# Patient Record
Sex: MALE | Race: WHITE
[De-identification: names, ages, dates, MRNs, and addresses within clinical notes are randomized per-mention and may not be internally consistent; named-entity substitution may affect disease eponyms.]

---

## 2018-12-17 ENCOUNTER — HOSPITAL ENCOUNTER (EMERGENCY)
Dept: HOSPITAL 74 - JER | Age: 78
Discharge: HOME | End: 2018-12-17
Payer: COMMERCIAL

## 2018-12-17 VITALS — HEART RATE: 73 BPM | DIASTOLIC BLOOD PRESSURE: 74 MMHG | SYSTOLIC BLOOD PRESSURE: 122 MMHG | TEMPERATURE: 98.2 F

## 2018-12-17 VITALS — BODY MASS INDEX: 38.7 KG/M2

## 2018-12-17 DIAGNOSIS — M79.605: Primary | ICD-10-CM
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
## 2019-01-12 ENCOUNTER — HOSPITAL ENCOUNTER (INPATIENT)
Dept: HOSPITAL 74 - JER | Age: 79
LOS: 6 days | Discharge: NURSING, REHAB OR LONG-TERM CARE | DRG: 481 | End: 2019-01-18
Attending: SPECIALIST | Admitting: SPECIALIST
Payer: COMMERCIAL

## 2019-01-12 VITALS — BODY MASS INDEX: 38.2 KG/M2

## 2019-01-12 DIAGNOSIS — E22.2: ICD-10-CM

## 2019-01-12 DIAGNOSIS — J98.11: ICD-10-CM
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
## 2019-01-12 DIAGNOSIS — E66.9: ICD-10-CM
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
## 2019-01-12 DIAGNOSIS — I10: ICD-10-CM

## 2019-01-12 DIAGNOSIS — S72.302A: Primary | ICD-10-CM

## 2019-01-12 DIAGNOSIS — E78.5: ICD-10-CM

## 2019-01-12 LAB
ALBUMIN SERPL-MCNC: 3.8 G/DL (ref 3.4–5)
ALP SERPL-CCNC: 121 U/L (ref 45–117)
ALT SERPL-CCNC: 18 U/L (ref 13–61)
ANION GAP SERPL CALC-SCNC: 6 MMOL/L (ref 8–16)
APTT BLD: 33 SECONDS (ref 25.2–36.5)
AST SERPL-CCNC: 18 U/L (ref 15–37)
BASOPHILS # BLD: 0.7 % (ref 0–2)
BILIRUB SERPL-MCNC: 0.5 MG/DL (ref 0.2–1)
BUN SERPL-MCNC: 24 MG/DL (ref 7–18)
CALCIUM SERPL-MCNC: 8.1 MG/DL (ref 8.5–10.1)
CHLORIDE SERPL-SCNC: 103 MMOL/L (ref 98–107)
CO2 SERPL-SCNC: 28 MMOL/L (ref 21–32)
CREAT SERPL-MCNC: 1.2 MG/DL (ref 0.55–1.3)
DEPRECATED RDW RBC AUTO: 12.7 % (ref 11.9–15.9)
EOSINOPHIL # BLD: 6.5 % (ref 0–4.5)
GLUCOSE SERPL-MCNC: 99 MG/DL (ref 74–106)
HCT VFR BLD CALC: 42.4 % (ref 35.4–49)
HGB BLD-MCNC: 14.7 GM/DL (ref 11.7–16.9)
INR BLD: 1.08 (ref 0.83–1.09)
LYMPHOCYTES # BLD: 14.1 % (ref 8–40)
MCH RBC QN AUTO: 32.4 PG (ref 25.7–33.7)
MCHC RBC AUTO-ENTMCNC: 34.7 G/DL (ref 32–35.9)
MCV RBC: 93.3 FL (ref 80–96)
MONOCYTES # BLD AUTO: 5.7 % (ref 3.8–10.2)
NEUTROPHILS # BLD: 73 % (ref 42.8–82.8)
PLATELET # BLD AUTO: 142 K/MM3 (ref 134–434)
PMV BLD: 11 FL (ref 7.5–11.1)
POTASSIUM SERPLBLD-SCNC: 4.5 MMOL/L (ref 3.5–5.1)
PROT SERPL-MCNC: 6.6 G/DL (ref 6.4–8.2)
PT PNL PPP: 12.7 SEC (ref 9.7–13)
RBC # BLD AUTO: 4.54 M/MM3 (ref 4–5.6)
SODIUM SERPL-SCNC: 137 MMOL/L (ref 136–145)
WBC # BLD AUTO: 8.6 K/MM3 (ref 4–10)

## 2019-01-12 RX ADMIN — PANTOPRAZOLE SODIUM SCH MG: 40 TABLET, DELAYED RELEASE ORAL at 16:14

## 2019-01-12 RX ADMIN — ATORVASTATIN CALCIUM SCH MG: 40 TABLET, FILM COATED ORAL at 22:55

## 2019-01-12 NOTE — PDOC
History of Present Illness





- General


Stated Complaint: LEFT KNEE PAIN


Time Seen by Provider: 01/12/19 12:55





- History of Present Illness


Initial Comments: 





01/12/19 12:55


Mr. Rdz is a 79 yo male w/ pmh of HTN, HLD, left knee replacement (w/ 

chronic pain) who presents for evaluation of pain to left knee after falling in 

the bath tub today. Patient had tri phasic bone scan 1/8/18 for his chronic 

pain which revealed no acute findings. Currently scheduled to follow-up with 

orthopedics on Monday. Patient currently reporting exacerbation of his L knee 

pain. Took oxycodone at home already.





The patient denies chest pain, shortness of breath, headache and dizziness. 

Denies fever, chills, nausea, vomit, diarrhea and constipation. Denies dysuria, 

frequency, urgency and hematuria. 





Past History





- Past Medical History


Allergies/Adverse Reactions: 


 Allergies











Allergy/AdvReac Type Severity Reaction Status Date / Time


 


valdecoxib [From Bextra] Allergy   Verified 12/17/18 10:57











Home Medications: 


Ambulatory Orders





Atenolol [Tenormin] 50 mg PO DAILY 12/17/18 


Atorvastatin Ca [Lipitor] 40 mg PO HS 12/17/18 


Diazepam [Valium] 10 mg PO DAILY PRN 12/17/18 


Naproxen [Naprosyn -] 500 mg PO BID 01/12/19 


Omeprazole 20 mg PO DAILY 01/12/19 


Oxycodone HCl [Roxicodone] 5 mg PO PRN PRN 01/12/19 








COPD: No


HTN: Yes





- Immunization History


Immunization Up to Date: No





- Suicide/Smoking/Psychosocial Hx


Smoking History: Never smoked


Have you smoked in the past 12 months: No


Hx Alcohol Use: No


Drug/Substance Use Hx: No





**Review of Systems





- Review of Systems


Comments:: 





01/12/19 12:55


GENERAL/CONSTITUTIONAL: No fever or chills. No weakness.


HEAD, EYES, EARS, NOSE AND THROAT: No change in vision. No ear pain or 

discharge. No sore throat.


CARDIOVASCULAR: No chest pain or shortness of breath


RESPIRATORY: No cough, wheezing, or hemoptysis.


GASTROINTESTINAL: No nausea, vomiting, diarrhea or constipation.


GENITOURINARY: No dysuria, frequency, or change in urination.


MUSCULOSKELETAL: +L knee pain as described. 


SKIN: No rash


NEUROLOGIC: No headache, vertigo, loss of consciousness, or change in strength/

sensation.


ENDOCRINE: No increased thirst. No abnormal weight change


HEMATOLOGIC/LYMPHATIC: No anemia, easy bleeding, or history of blood clots.


ALLERGIC/IMMUNOLOGIC: No hives or skin allergy.


01/12/19 13:09








*Physical Exam





- Physical Exam


Comments: 





01/12/19 12:55


GENERAL: Awake, alert, and fully oriented, in no acute distress


HEAD: No signs of trauma, normocephalic, atraumatic 


EYES: PERRLA, EOMI, sclera anicteric, conjunctiva clear


ENT: Auricles normal inspection, hearing grossly normal, nares patent, 

oropharynx clear without


exudates. Moist mucosa


NECK: Normal ROM, supple, no lymphadenopathy, JVD, or masses


LUNGS: No distress, speaks full sentences, clear to auscultation bilaterally 


HEART: Regular rate and rhythm, normal S1 and S2, no murmurs, rubs or gallops, 

peripheral pulses normal and equal bilaterally. 


ABDOMEN: Soft, nontender, normoactive bowel sounds. No guarding, no rebound. No 

masses


EXTREMITIES: +Left knee TTP. Bruising noted to LLE, outside thigh. Pain w/ 

flexion; unable to perform ROM testing due to pain.


NEUROLOGICAL: Cranial nerves II through XII grossly intact. Normal speech, 

normal gait, no focal sensorimotor deficits 


SKIN: Warm, Dry, normal turgor, no rashes or lesions noted. 





Medical Decision Making





- Medical Decision Making





01/12/19 14:40


Mr. Rdz is a 79 yo male w/ pmh as described who presents for evaluation of 

knee pain s/p fall. Patient pain controlled with percocet. XR taken of knee, 

femur, pelvis revealed L midshaft femoral fracture. Orthopedist paged for 

evaluation. PCP paged for admission.





01/12/19 15:04


Patient admitted. Orthopedist consulted and will evaluate. Likely surgery 

Monday 1/14/2019.





*DC/Admit/Observation/Transfer


Diagnosis at time of Disposition: 


Femur fracture, left


Qualifiers:


 Encounter type: initial encounter Femur location: unspecified portion of femur 

Fracture type: closed Fracture morphology: unspecified fracture morphology 

Qualified Code(s): S72.92XA - Unspecified fracture of left femur, initial 

encounter for closed fracture








- Discharge Dispostion


Decision to Admit order: Yes





- Referrals


Referrals: 


Hong Mustafa MD [Primary Care Provider] - 





- Patient Instructions





- Post Discharge Activity

## 2019-01-12 NOTE — CON.CARD
Consult


Consult Specialty:: Cardiology


Reason for Consultation:: preop eval





- History of Present Illness


History of Present Illness: 





The patient is a 78 year old male with a significant PMH of HTN, HLD, left knee 

replacement who presents to the emergency department complaining of left knee 

pain s/p fall today morning. Patient took oxycodone earlier with no relief. 

Patient states he fell when he was in the shower today. Patient had a bone scan 

on 1/8/18 for chronic left knee pain. Patient took oxycodone earlier with no 

relief. 





The patient denies chest pain, shortness of breath, headache and dizziness.


Denies fever, chills, nausea, vomit, diarrhea and constipation.


Denies dysuria, frequency, urgency and hematuria.


 


Allergies: NKA


Past surgical history: left knee replacement


Social history: No reported alcohol, drug or cigarette use. 


PCP: Dr. Mustafa








- History Source


History Provided By: Patient, Medical Record





- Past Medical History


Cardio/Vascular: Yes: HTN, Hyperlipdemia





- Alcohol/Substance Use


Hx Alcohol Use: No





- Smoking History


Smoking history: Never smoked


Have you smoked in the past 12 months: No





Home Medications





- Allergies


Allergies/Adverse Reactions: 


 Allergies











Allergy/AdvReac Type Severity Reaction Status Date / Time


 


valdecoxib [From Bextra] Allergy   Verified 12/17/18 10:57














- Home Medications


Home Medications: 


Ambulatory Orders





Atenolol [Tenormin] 50 mg PO DAILY 12/17/18 


Atorvastatin Ca [Lipitor] 40 mg PO HS 12/17/18 


Diazepam [Valium] 10 mg PO DAILY PRN 12/17/18 


Naproxen [Naprosyn -] 500 mg PO BID 01/12/19 


Omeprazole 20 mg PO DAILY 01/12/19 


Oxycodone HCl [Roxicodone] 5 mg PO PRN PRN 01/12/19 











Review of Systems





- Review of Systems


Constitutional: reports: No Symptoms


Eyes: reports: No Symptoms


HENT: reports: No Symptoms


Neck: reports: No Symptoms


Cardiovascular: reports: No Symptoms


Gastrointestinal: reports: No Symptoms


Genitourinary: reports: No Symptoms


Breasts: reports: No Symptoms Reported


Musculoskeletal: reports: No Symptoms


Integumentary: reports: No Symptoms


Neurological: reports: No Symptoms


Endocrine: reports: No Symptoms


Hematology/Lymphatic: reports: No Symptoms


Psychiatric: reports: No Symptoms


Vital Signs: 


 Vital Signs











Temperature  98.3 F   01/12/19 16:20


 


Pulse Rate  57 L  01/12/19 16:20


 


Respiratory Rate  16   01/12/19 14:57


 


Blood Pressure  134/71   01/12/19 16:20


 


O2 Sat by Pulse Oximetry (%)  93 L  01/12/19 16:20











Constitutional: Yes: Well Nourished, No Distress, Calm


Eyes: Yes: WNL, Conjunctiva Clear, EOM Intact


HENT: Yes: WNL, Atraumatic, Normocephalic


Neck: Yes: WNL, Supple, Trachea Midline


Respiratory: Yes: WNL, Regular, CTA Bilaterally


Gastrointestinal: Yes: WNL, Normal Bowel Sounds


Renal/: Yes: WNL


Cardiovascular: Yes: WNL, Regular Rate and Rhythm


Musculoskeletal: Yes: WNL


Extremities: Yes: WNL


Integumentary: Yes: WNL


Neurological: Yes: WNL, Alert, Oriented


...Motor Strength: WNL


Psychiatric: Yes: WNL, Alert, Oriented





- Other Data


Labs, Other Data: 


 CBC, BMP





 01/12/19 15:15 





 01/12/19 15:15 





 INR, PTT











INR  1.08  (0.83-1.09)   01/12/19  15:15    








 Laboratory Tests











  01/12/19 01/12/19 01/12/19





  15:15 15:15 15:15


 


WBC  8.6  


 


RBC  4.54  


 


Hgb  14.7  


 


Hct  42.4  


 


MCV  93.3  


 


MCH  32.4  


 


MCHC  34.7  


 


RDW  12.7  


 


Plt Count  142  


 


MPV  11.0  


 


Absolute Neuts (auto)  6.3  


 


Neutrophils %  73.0  


 


Lymphocytes %  14.1  


 


Monocytes %  5.7  


 


Eosinophils %  6.5 H  


 


Basophils %  0.7  


 


Nucleated RBC %  0  


 


PT with INR   12.70 


 


INR   1.08 


 


PTT (Actin FS)   33.0 


 


Sodium    137


 


Potassium    4.5


 


Chloride    103


 


Carbon Dioxide    28


 


Anion Gap    6 L


 


BUN    24 H


 


Creatinine    1.2


 


Creat Clearance w eGFR    58.56


 


Random Glucose    99


 


Calcium    8.1 L


 


Total Bilirubin    0.5


 


AST    18


 


ALT    18


 


Alkaline Phosphatase    121 H


 


Total Protein    6.6


 


Albumin    3.8


 


Anti-A Titer   


 


Blood Type   


 


Antibody Screen   














  01/12/19 01/12/19





  15:15 17:22


 


WBC  


 


RBC  


 


Hgb  


 


Hct  


 


MCV  


 


MCH  


 


MCHC  


 


RDW  


 


Plt Count  


 


MPV  


 


Absolute Neuts (auto)  


 


Neutrophils %  


 


Lymphocytes %  


 


Monocytes %  


 


Eosinophils %  


 


Basophils %  


 


Nucleated RBC %  


 


PT with INR  


 


INR  


 


PTT (Actin FS)  


 


Sodium  


 


Potassium  


 


Chloride  


 


Carbon Dioxide  


 


Anion Gap  


 


BUN  


 


Creatinine  


 


Creat Clearance w eGFR  


 


Random Glucose  


 


Calcium  


 


Total Bilirubin  


 


AST  


 


ALT  


 


Alkaline Phosphatase  


 


Total Protein  


 


Albumin  


 


Anti-A Titer  Cancelled 


 


Blood Type  Cancelled  O POSITIVE


 


Antibody Screen  Cancelled  Negative














Imaging





- Results


Chest X-ray: Image Reviewed (no i/e)


EKG: Image Reviewed (sr lvh rep abn no chanes)





Problem List





- Problems


(1) Femur fracture, left


Code(s): S72.92XA - UNSP FRACTURE OF LEFT FEMUR, INIT ENCNTR FOR CLOSED 

FRACTURE   


Qualifiers: 


   Encounter type: initial encounter   Femur location: unspecified portion of 

femur   Fracture type: closed   Fracture morphology: unspecified fracture 

morphology   Qualified Code(s): S72.92XA - Unspecified fracture of left femur, 

initial encounter for closed fracture   





(2) HLD (hyperlipidemia)


Code(s): E78.5 - HYPERLIPIDEMIA, UNSPECIFIED   





(3) HTN (hypertension)


Code(s): I10 - ESSENTIAL (PRIMARY) HYPERTENSION   





(4) Obesity


Code(s): E66.9 - OBESITY, UNSPECIFIED   





(5) Leg pain, left


Code(s): M79.605 - PAIN IN LEFT LEG   





Assessment/Plan





l femur fx


s/p l tkr


htn


hlp


reports negative cardiac w/u last year 


no evidence of angina or mi, reports good exercise tolerance prior to fall





Plan








patient is low risk for cardiovascular complications for ORIF.





cont DVT plx

## 2019-01-12 NOTE — PDOC
Attending Attestation





- HPI


HPI: 





01/12/19 13:47


 The patient is a 78 year old male with a significant PMH of HTN, HLD, left 

knee replacement who presents to the emergency department complaining of left 

knee pain s/p fall today morning. Patient took oxycodone earlier with no 

relief. Patient states he fell when he was in the shower today. Patient had a 

bone scan on 1/8/18 for chronic left knee pain. Patient took oxycodone earlier 

with no relief. 





The patient denies chest pain, shortness of breath, headache and dizziness.


Denies fever, chills, nausea, vomit, diarrhea and constipation.


Denies dysuria, frequency, urgency and hematuria.


 


Allergies: NKA


Past surgical history: left knee replacement


Social history: No reported alcohol, drug or cigarette use. 


PCP: Dr. Mustafa





 





<Marylou Vaz - Last Filed: 01/12/19 13:48>





- Resident


Resident Name: Shyam De Jesus





- ED Attending Attestation


I have performed the following: I have examined & evaluated the patient, The 

case was reviewed & discussed with the resident, I agree w/resident's findings 

& plan, Exceptions are as noted





- Physicial Exam


PE: 





GENERAL: Awake, alert, and fully oriented, in no acute distress


HEAD: No signs of trauma


EYES: PERRLA, EOMI, sclera anicteric, conjunctiva clear


ENT: Auricles normal inspection, hearing grossly normal, nares patent, 

oropharynx clear without exudates. Moist mucosa


NECK: Normal ROM, supple, no lymphadenopathy, JVD, or masses


LUNGS: Breath sounds equal, clear to auscultation bilaterally.  No wheezes, and 

no crackles


HEART: Regular rate and rhythm, normal S1 and S2, no murmurs, rubs or gallops


ABDOMEN: Soft, nontender, normoactive bowel sounds.  No guarding, no rebound.  

No masses


EXTREMITIES: L leg with ecchymosis to the lateral thigh. No bony tenderness to 

the thigh or knee, however, pain is elicited upon movement of the L leg. +

Swelling to the L knee. Remainder of extremities with normal range of motion, 

no edema.  No clubbing or cyanosis. No cords, erythema, or tenderness


NEUROLOGICAL: Cranial nerves II through XII grossly intact.  Normal speech. 

Motor and sensation intact


SKIN: Warm, Dry, normal turgor, no rashes or lesions noted. 








- Medical Decision Making





Pt with L knee pain, noted to have L thigh ecchymosis. Obtained XR hip, femur, 

and knee on the L. Pt found to have L mid-shaft femur fx. Contacted PMD and 

ortho. For admission.








<Nasreen White - Last Filed: 01/12/19 15:00>

## 2019-01-12 NOTE — HP
Admitting History and Physical





- Primary Care Physician


PCP: Hong Mustafa





- Admission


Chief Complaint: Left Knee Pain.  Fall


History of Present Illness: 





Pt lost his footing and fell in the bathroom today and developed left leg pain; 

pt w/o head trauma. He came to ER and it was noticed to have Left femoral Fx


Limitations to Obtaining History: No Limitations





- Past Medical History


Cardiovascular: Yes: HTN, Hyperlipdemia


Additional Past Medical History: 





Left elbow Fx -as a child


Left Knee replacement





- Smoking History


Smoking history: Never smoked


Have you smoked in the past 12 months: No





- Alcohol/Substance Use


Hx Alcohol Use: No





Home Medications





- Allergies


Allergies/Adverse Reactions: 


 Allergies











Allergy/AdvReac Type Severity Reaction Status Date / Time


 


valdecoxib [From Bextra] Allergy   Verified 12/17/18 10:57














- Home Medications


Home Medications: 


Ambulatory Orders





Atenolol [Tenormin] 50 mg PO DAILY 12/17/18 


Atorvastatin Ca [Lipitor] 40 mg PO HS 12/17/18 


Diazepam [Valium] 10 mg PO DAILY PRN 12/17/18 


Naproxen [Naprosyn -] 500 mg PO BID 01/12/19 


Omeprazole 20 mg PO DAILY 01/12/19 


Oxycodone HCl [Roxicodone] 5 mg PO PRN PRN 01/12/19 











Review of Systems





- Review of Systems


Constitutional: denies: Chills, Fever


Eyes: denies: Blurred Vision, Recent Change in Vision


HENT: denies: Difficult Swallowing, Ear Pain, Nasal Congestion, Throat Pain


Neck: denies: Pain on Movement, Stiffness


Cardiovascular: denies: Chest Pain, Edema, Palpitations


Respiratory: denies: Cough, SOB, SOB on Exertion, Wheezing


Gastrointestinal: denies: Abdominal Pain, Nausea, Vomiting


Genitourinary: denies: Burning, Discharge, Dysuria


Musculoskeletal: denies: Back Pain, Muscle Pain


Integumentary: denies: Bruising, Rash


Neurological: denies: Change in LOC, Numbness, Weakness


Endocrine: denies: Excessive Sweating, Intolerance to Cold


Psychiatric: denies: Altered Sleep Pattern, Anxiety, Depression





Physical Examination


Vital Signs: 


 Vital Signs











Temperature  98.5 F   01/12/19 14:57


 


Pulse Rate  67   01/12/19 14:57


 


Respiratory Rate  16   01/12/19 14:57


 


Blood Pressure  137/77   01/12/19 14:57


 


O2 Sat by Pulse Oximetry (%)  95   01/12/19 14:57











Constitutional: Yes: No Distress, Calm


Eyes: Yes: EOM Intact, PERRL


HENT: Yes: Normocephalic.  No: Pharyngeal Erythema, Rhinnorhea, Thrush


Neck: Yes: Trachea Midline.  No: Lymphadenopathy


Cardiovascular: Yes: Regular Rate and Rhythm, S1, S2


Respiratory: Yes: Regular, CTA Bilaterally.  No: Rales


Gastrointestinal: Yes: Normal Bowel Sounds, Soft, Abdomen, Obese.  No: 

Tenderness


...Rectal Exam: Yes: Deferred


Renal/: No: CVA Tenderness - Left, CVA Tenderness - Right


Musculoskeletal: No: Back Pain, Joint Swelling


Extremities: Yes: Cool, Other (lwft leg is externally rotated).  No: Cold


Edema: No


Neurological: Yes: Alert, Oriented, Other (motor and sensory examinatin is 

symmetric in UE/LE/ face)


Psychiatric: Yes: Alert, Oriented


Labs: 


 CBC, BMP





 01/12/19 15:15 











Imaging





- Results


X-ray: Report Reviewed





Problem List





- Problems


(1) Femur fracture, left


Code(s): S72.92XA - UNSP FRACTURE OF LEFT FEMUR, INIT ENCNTR FOR CLOSED 

FRACTURE   


Qualifiers: 


   Encounter type: initial encounter   Femur location: unspecified portion of 

femur   Fracture type: closed   Fracture morphology: unspecified fracture 

morphology   Qualified Code(s): S72.92XA - Unspecified fracture of left femur, 

initial encounter for closed fracture   





(2) Leg pain, left


Code(s): M79.605 - PAIN IN LEFT LEG   





(3) HTN (hypertension)


Code(s): I10 - ESSENTIAL (PRIMARY) HYPERTENSION   





(4) HLD (hyperlipidemia)


Code(s): E78.5 - HYPERLIPIDEMIA, UNSPECIFIED   





(5) Obesity


Code(s): E66.9 - OBESITY, UNSPECIFIED   





Assessment/Plan





Ortho consult


Cardio consult for clearance


Pain ncontrol


DVT prophylaxis


AM labs

## 2019-01-13 LAB
ANION GAP SERPL CALC-SCNC: 5 MMOL/L (ref 8–16)
BUN SERPL-MCNC: 23 MG/DL (ref 7–18)
CALCIUM SERPL-MCNC: 8.8 MG/DL (ref 8.5–10.1)
CHLORIDE SERPL-SCNC: 102 MMOL/L (ref 98–107)
CO2 SERPL-SCNC: 29 MMOL/L (ref 21–32)
CREAT SERPL-MCNC: 1.1 MG/DL (ref 0.55–1.3)
DEPRECATED RDW RBC AUTO: 13.2 % (ref 11.9–15.9)
GLUCOSE SERPL-MCNC: 100 MG/DL (ref 74–106)
HCT VFR BLD CALC: 43.7 % (ref 35.4–49)
HGB BLD-MCNC: 15.1 GM/DL (ref 11.7–16.9)
MCH RBC QN AUTO: 32.5 PG (ref 25.7–33.7)
MCHC RBC AUTO-ENTMCNC: 34.6 G/DL (ref 32–35.9)
MCV RBC: 93.8 FL (ref 80–96)
PLATELET # BLD AUTO: 130 K/MM3 (ref 134–434)
PMV BLD: 10.9 FL (ref 7.5–11.1)
POTASSIUM SERPLBLD-SCNC: 4.2 MMOL/L (ref 3.5–5.1)
RBC # BLD AUTO: 4.66 M/MM3 (ref 4–5.6)
SODIUM SERPL-SCNC: 136 MMOL/L (ref 136–145)
WBC # BLD AUTO: 6.9 K/MM3 (ref 4–10)

## 2019-01-13 RX ADMIN — ATORVASTATIN CALCIUM SCH MG: 40 TABLET, FILM COATED ORAL at 21:34

## 2019-01-13 RX ADMIN — HEPARIN SODIUM SCH UNIT: 5000 INJECTION, SOLUTION INTRAVENOUS; SUBCUTANEOUS at 00:43

## 2019-01-13 RX ADMIN — HEPARIN SODIUM SCH UNIT: 5000 INJECTION, SOLUTION INTRAVENOUS; SUBCUTANEOUS at 10:15

## 2019-01-13 RX ADMIN — PANTOPRAZOLE SODIUM SCH MG: 40 TABLET, DELAYED RELEASE ORAL at 10:15

## 2019-01-13 RX ADMIN — ATENOLOL SCH MG: 50 TABLET ORAL at 10:15

## 2019-01-13 RX ADMIN — HEPARIN SODIUM SCH UNIT: 5000 INJECTION, SOLUTION INTRAVENOUS; SUBCUTANEOUS at 21:34
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
## 2019-01-13 NOTE — PN
Progress Note, Physician


History of Present Illness: 





The patient is a 78 year old male with a significant PMH of HTN, HLD, left knee 

replacement who presents to the emergency department complaining of left knee 

pain s/p fall today morning. Patient took oxycodone earlier with no relief. 

Patient states he fell when he was in the shower today. Patient had a bone scan 

on 1/8/18 for chronic left knee pain. Patient took oxycodone earlier with no 

relief. 





The patient denies chest pain, shortness of breath, headache and dizziness.


Denies fever, chills, nausea, vomit, diarrhea and constipation.


Denies dysuria, frequency, urgency and hematuria.


 


Allergies: NKA


Past surgical history: left knee replacement


Social history: No reported alcohol, drug or cigarette use. 


PCP: Dr. Mustafa








- Current Medication List


Current Medications: 


Active Medications





Acetaminophen (Tylenol -)  650 mg PO Q6H PRN


   PRN Reason: PAIN LEVEL 1-5


Acetaminophen (Tylenol -)  650 mg PO Q6H PRN


   PRN Reason: PAIN LEVEL 6-10


   Last Admin: 01/13/19 00:51 Dose:  650 mg


Atenolol (Tenormin -)  50 mg PO DAILY [facility]


Atorvastatin Calcium (Lipitor -)  40 mg PO HS [facility]


   Last Admin: 01/12/19 22:55 Dose:  40 mg


Diazepam (Valium -)  10 mg PO DAILY PRN


   PRN Reason: ANXIETY


   Last Admin: 01/12/19 22:55 Dose:  10 mg


Heparin Sodium (Porcine) (Heparin -)  5,000 unit SQ BID [facility]


   Last Admin: 01/13/19 00:43 Dose:  5,000 unit


Oxycodone HCl (Roxicodone -)  10 mg PO Q6H PRN


   PRN Reason: PAIN LEVEL 6-10


   Last Admin: 01/13/19 08:25 Dose:  10 mg


Pantoprazole Sodium (Protonix -)  40 mg PO DAILY [facility]


   Last Admin: 01/12/19 16:14 Dose:  40 mg











- Objective


Vital Signs: 


 Vital Signs











Temperature  98.2 F   01/13/19 06:44


 


Pulse Rate  101 H  01/13/19 06:44


 


Respiratory Rate  18   01/13/19 06:44


 


Blood Pressure  145/80   01/13/19 06:44


 


O2 Sat by Pulse Oximetry (%)  96   01/13/19 00:11











Eyes: Yes: WNL, Conjunctiva Clear, EOM Intact


HENT: Yes: WNL, Atraumatic, Normocephalic


Neck: Yes: WNL, Supple, Trachea Midline


Cardiovascular: Yes: WNL, Regular Rate and Rhythm


Respiratory: Yes: WNL, Regular, CTA Bilaterally


Gastrointestinal: Yes: WNL, Normal Bowel Sounds


Genitourinary: Yes: WNL


Musculoskeletal: Yes: WNL


Extremities: Yes: WNL


Edema: No


Integumentary: Yes: WNL


Neurological: Yes: WNL, Alert, Oriented


...Motor Strength: WNL


Psychiatric: Yes: WNL


Labs: 


 INR, PTT











INR  1.08  (0.83-1.09)   01/12/19  15:15    














Problem List





- Problems


(1) Femur fracture, left


Code(s): S72.92XA - UNSP FRACTURE OF LEFT FEMUR, INIT ENCNTR FOR CLOSED 

FRACTURE   


Qualifiers: 


   Encounter type: initial encounter   Femur location: unspecified portion of 

femur   Fracture type: closed   Fracture morphology: unspecified fracture 

morphology   Qualified Code(s): S72.92XA - Unspecified fracture of left femur, 

initial encounter for closed fracture   





(2) HLD (hyperlipidemia)


Code(s): E78.5 - HYPERLIPIDEMIA, UNSPECIFIED   





(3) HTN (hypertension)


Code(s): I10 - ESSENTIAL (PRIMARY) HYPERTENSION   





(4) Obesity


Code(s): E66.9 - OBESITY, UNSPECIFIED   





(5) Leg pain, left


Code(s): M79.605 - PAIN IN LEFT LEG   





Assessment/Plan





l femur fx


s/p l tkr


htn


hlp


reports negative cardiac w/u last year 


no evidence of angina or mi, reports good exercise tolerance prior to fall





Plan








patient is low risk for cardiovascular complications for ORIF.





cont DVT plx

## 2019-01-13 NOTE — EKG
Test Reason : 

Blood Pressure : ***/*** mmHG

Vent. Rate : 063 BPM     Atrial Rate : 063 BPM

   P-R Int : 208 ms          QRS Dur : 094 ms

    QT Int : 460 ms       P-R-T Axes : 029 -36 -11 degrees

   QTc Int : 470 ms

 

NORMAL SINUS RHYTHM

LEFT AXIS DEVIATION

VOLTAGE CRITERIA FOR LEFT VENTRICULAR HYPERTROPHY

NONSPECIFIC ST ABNORMALITY

ABNORMAL ECG

WHEN COMPARED WITH ECG OF 01-SEP-2011 07:42,

MINIMAL CRITERIA FOR SEPTAL INFARCT ARE NO LONGER PRESENT

NON-SPECIFIC CHANGE IN ST SEGMENT IN ANTERIOR LEADS

NONSPECIFIC T WAVE ABNORMALITY NO LONGER EVIDENT IN ANTEROLATERAL

LEADS

QT HAS SHORTENED

Confirmed by ISRAEL MATOS, JOLLY ([id number]) on 1/13/2019 8:40:47 AM

 

Referred By:             Confirmed By:JOLLY WOOD MD

## 2019-01-13 NOTE — PN
Progress Note, Physician


Chief Complaint: 





L femoral pain with moving partially better with percocet 





- Current Medication List


Current Medications: 


Active Medications





Acetaminophen (Tylenol -)  650 mg PO Q6H PRN


   PRN Reason: PAIN LEVEL 1-5


Acetaminophen (Tylenol -)  650 mg PO Q6H PRN


   PRN Reason: PAIN LEVEL 6-10


   Last Admin: 01/13/19 00:51 Dose:  650 mg


Atenolol (Tenormin -)  50 mg PO DAILY [facility]


Atorvastatin Calcium (Lipitor -)  40 mg PO HS [facility]


   Last Admin: 01/12/19 22:55 Dose:  40 mg


Diazepam (Valium -)  10 mg PO DAILY PRN


   PRN Reason: ANXIETY


   Last Admin: 01/12/19 22:55 Dose:  10 mg


Heparin Sodium (Porcine) (Heparin -)  5,000 unit SQ BID [facility]


   Last Admin: 01/13/19 00:43 Dose:  5,000 unit


Oxycodone HCl (Roxicodone -)  10 mg PO Q6H PRN


   PRN Reason: PAIN LEVEL 6-10


   Last Admin: 01/13/19 00:46 Dose:  10 mg


Pantoprazole Sodium (Protonix -)  40 mg PO DAILY [facility]


   Last Admin: 01/12/19 16:14 Dose:  40 mg











- Objective


Vital Signs: 


 Vital Signs











Temperature  97.9 F   01/13/19 00:16


 


Pulse Rate  62   01/13/19 00:16


 


Respiratory Rate  18   01/13/19 00:16


 


Blood Pressure  155/80   01/13/19 00:16


 


O2 Sat by Pulse Oximetry (%)  96   01/13/19 00:11











Constitutional: Yes: No Distress, Calm


Eyes: Yes: Conjunctiva Clear


HENT: Yes: Atraumatic


Neck: Yes: Supple


Cardiovascular: Yes: Regular Rate and Rhythm


Respiratory: Yes: CTA Bilaterally


Gastrointestinal: Yes: Soft.  No: Distention


Genitourinary: No: CVA Tenderness - Left, CVA Tenderness - Right


Musculoskeletal: No: Joint Stiffness, Joint Swelling


Extremities: Yes: External Rotation (LLE).  No: Cold, Cool, Cyanosis


Edema: No


Integumentary: No: Rash, Venous Stasis Changes


Neurological: Yes: WNL, Alert, Oriented


...Motor Strength: WNL


Psychiatric: Yes: WNL, Alert, Oriented.  No: Agitated, Suicidal Ideation


Labs: 


 CBC, BMP





 01/12/19 15:15 





 01/12/19 15:15 





 INR, PTT











INR  1.08  (0.83-1.09)   01/12/19  15:15    














- ....Imaging


Other: Report Reviewed





Assessment/Plan





 The patient is a 78 year old male with a significant PMH of HTN, HLD, left 

knee replacement admitted with L mid-shaft femoral fracture


labs CXR EKG noted; cleared by cardiology for ortho surgery


d/w dr Park 


no absolute contraindications for the proposed L femoral surgery


DVT pfx


NPO after midnight if surgery bwill be done tomorrow; 


pain meds


d/w pt and staff

## 2019-01-14 LAB
ALBUMIN SERPL-MCNC: 3.3 G/DL (ref 3.4–5)
ALP SERPL-CCNC: 113 U/L (ref 45–117)
ALT SERPL-CCNC: 14 U/L (ref 13–61)
ANION GAP SERPL CALC-SCNC: 8 MMOL/L (ref 8–16)
AST SERPL-CCNC: 11 U/L (ref 15–37)
BASOPHILS # BLD: 0.5 % (ref 0–2)
BILIRUB SERPL-MCNC: 0.6 MG/DL (ref 0.2–1)
BUN SERPL-MCNC: 19 MG/DL (ref 7–18)
CALCIUM SERPL-MCNC: 8.5 MG/DL (ref 8.5–10.1)
CHLORIDE SERPL-SCNC: 100 MMOL/L (ref 98–107)
CO2 SERPL-SCNC: 27 MMOL/L (ref 21–32)
CREAT SERPL-MCNC: 1.1 MG/DL (ref 0.55–1.3)
DEPRECATED RDW RBC AUTO: 12.9 % (ref 11.9–15.9)
EOSINOPHIL # BLD: 8 % (ref 0–4.5)
GLUCOSE SERPL-MCNC: 91 MG/DL (ref 74–106)
HCT VFR BLD CALC: 44.3 % (ref 35.4–49)
HGB BLD-MCNC: 14.2 GM/DL (ref 11.7–16.9)
LYMPHOCYTES # BLD: 21.4 % (ref 8–40)
MCH RBC QN AUTO: 30.5 PG (ref 25.7–33.7)
MCHC RBC AUTO-ENTMCNC: 32.1 G/DL (ref 32–35.9)
MCV RBC: 95 FL (ref 80–96)
MONOCYTES # BLD AUTO: 11.4 % (ref 3.8–10.2)
NEUTROPHILS # BLD: 58.7 % (ref 42.8–82.8)
PLATELET # BLD AUTO: 114 K/MM3 (ref 134–434)
PMV BLD: 11.1 FL (ref 7.5–11.1)
POTASSIUM SERPLBLD-SCNC: 4.1 MMOL/L (ref 3.5–5.1)
PROT SERPL-MCNC: 6.3 G/DL (ref 6.4–8.2)
RBC # BLD AUTO: 4.66 M/MM3 (ref 4–5.6)
SODIUM SERPL-SCNC: 135 MMOL/L (ref 136–145)
WBC # BLD AUTO: 6 K/MM3 (ref 4–10)

## 2019-01-14 PROCEDURE — 0QS906Z REPOSITION LEFT FEMORAL SHAFT WITH INTRAMEDULLARY INTERNAL FIXATION DEVICE, OPEN APPROACH: ICD-10-PCS | Performed by: ORTHOPAEDIC SURGERY

## 2019-01-14 PROCEDURE — 07DR3ZZ EXTRACTION OF ILIAC BONE MARROW, PERCUTANEOUS APPROACH: ICD-10-PCS | Performed by: ORTHOPAEDIC SURGERY

## 2019-01-14 PROCEDURE — 0QU947Z SUPPLEMENT LEFT FEMORAL SHAFT WITH AUTOLOGOUS TISSUE SUBSTITUTE, PERCUTANEOUS ENDOSCOPIC APPROACH: ICD-10-PCS | Performed by: ORTHOPAEDIC SURGERY

## 2019-01-14 RX ADMIN — PANTOPRAZOLE SODIUM SCH: 40 TABLET, DELAYED RELEASE ORAL at 10:58

## 2019-01-14 RX ADMIN — CEFAZOLIN SODIUM SCH MLS/HR: 2 SOLUTION INTRAVENOUS at 22:04

## 2019-01-14 RX ADMIN — ATENOLOL SCH MG: 50 TABLET ORAL at 11:13

## 2019-01-14 RX ADMIN — ACETAMINOPHEN PRN MG: 325 TABLET ORAL at 23:26

## 2019-01-14 RX ADMIN — ATORVASTATIN CALCIUM SCH MG: 40 TABLET, FILM COATED ORAL at 21:07

## 2019-01-14 RX ADMIN — HEPARIN SODIUM SCH: 5000 INJECTION, SOLUTION INTRAVENOUS; SUBCUTANEOUS at 10:59

## 2019-01-14 NOTE — PN
Progress Note, Physician


Chief Complaint: 





in bed awake alert NAD afebrile VSS awaiting leg surgery no c/o except LLE pain 

with moving.





- Current Medication List


Current Medications: 


Active Medications





Acetaminophen (Tylenol -)  650 mg PO Q6H PRN


   PRN Reason: PAIN LEVEL 1-5


Acetaminophen (Tylenol -)  650 mg PO Q6H PRN


   PRN Reason: PAIN LEVEL 6-10


   Last Admin: 01/13/19 00:51 Dose:  650 mg


Atenolol (Tenormin -)  50 mg PO DAILY [facility]


   Last Admin: 01/13/19 10:15 Dose:  50 mg


Atorvastatin Calcium (Lipitor -)  40 mg PO HS [facility]


   Last Admin: 01/13/19 21:34 Dose:  40 mg


Heparin Sodium (Porcine) (Heparin -)  5,000 unit SQ BID [facility]


   Last Admin: 01/13/19 21:34 Dose:  5,000 unit


Oxycodone HCl (Roxicodone -)  10 mg PO Q6H PRN


   PRN Reason: PAIN LEVEL 6-10


   Last Admin: 01/14/19 01:19 Dose:  10 mg


Pantoprazole Sodium (Protonix -)  40 mg PO DAILY [facility]


   Last Admin: 01/13/19 10:15 Dose:  40 mg











- Objective


Vital Signs: 


 Vital Signs











Temperature  98.6 F   01/14/19 06:00


 


Pulse Rate  68   01/14/19 06:00


 


Respiratory Rate  20   01/14/19 06:00


 


Blood Pressure  153/76   01/14/19 06:00


 


O2 Sat by Pulse Oximetry (%)  96   01/13/19 00:11











Constitutional: Yes: No Distress, Calm


Eyes: Yes: Conjunctiva Clear


HENT: Yes: Atraumatic


Neck: Yes: Supple


Cardiovascular: Yes: Regular Rate and Rhythm


Respiratory: Yes: CTA Bilaterally


Gastrointestinal: Yes: Soft.  No: Tenderness


Genitourinary: No: CVA Tenderness - Left, CVA Tenderness - Right


Musculoskeletal: No: Joint Stiffness, Joint Swelling


Extremities: No: Cold, Cool


Edema: No


Integumentary: No: Rash, Venous Stasis Changes


Neurological: Yes: WNL, Alert, Oriented


...Motor Strength: WNL


Psychiatric: Yes: WNL, Alert, Oriented.  No: Agitated, Suicidal Ideation


Labs: 


 CBC, BMP





 01/14/19 06:30 





 01/14/19 06:30 





 INR, PTT











INR  1.08  (0.83-1.09)   01/12/19  15:15    














- ....Imaging


Other: Report Reviewed





Assessment/Plan





 The patient is a 78 year old male with a significant PMH of HTN, HLD, left 

knee replacement admitted with L mid-shaft femoral fracture


labs CXR EKG noted; cleared by cardiology for ortho surgery


no absolute contraindications for the proposed L femoral surgery


DVT pfx


NPO today 


pain meds


d/w pt and staff - pt agreed with procedure

## 2019-01-14 NOTE — OP
Operative Note





- Note:


Operative Date: 01/14/19 ([facility])


Pre-Operative Diagnosis: left femoral shaft fx


Operation: left long IM gamma nail


Post-Operative Diagnosis: Same as Pre-op


Surgeon: Kenneth Cooper


Assistant: Chuy Dennison


Estimated Blood Loss (mls): 50


Operative Report Dictated: Yes

## 2019-01-14 NOTE — PN
Progress Note, Physician


History of Present Illness: 





The patient is a 78 year old male with a significant PMH of HTN, HLD, left knee 

replacement who presents to the emergency department complaining of left knee 

pain s/p fall today morning. Patient took oxycodone earlier with no relief. 

Patient states he fell when he was in the shower today. Patient had a bone scan 

on 1/8/18 for chronic left knee pain. Patient took oxycodone earlier with no 

relief. 





The patient denies chest pain, shortness of breath, headache and dizziness.


Denies fever, chills, nausea, vomit, diarrhea and constipation.


Denies dysuria, frequency, urgency and hematuria.


 


Allergies: NKA


Past surgical history: left knee replacement


Social history: No reported alcohol, drug or cigarette use. 


PCP: Dr. Mustafa








- Current Medication List


Current Medications: 


Active Medications





Acetaminophen (Tylenol -)  650 mg PO Q6H PRN


   PRN Reason: PAIN LEVEL 6-10


   Last Admin: 01/13/19 00:51 Dose:  650 mg


Atenolol (Tenormin -)  50 mg PO DAILY [facility]


   Last Admin: 01/14/19 11:13 Dose:  50 mg


Atorvastatin Calcium (Lipitor -)  40 mg PO HS [facility]


   Last Admin: 01/13/19 21:34 Dose:  40 mg


Heparin Sodium (Porcine) (Heparin -)  5,000 unit SQ BID [facility]


   Last Admin: 01/14/19 10:59 Dose:  Not Given


Oxycodone HCl (Roxicodone -)  10 mg PO Q6H PRN


   PRN Reason: PAIN LEVEL 6-10


   Last Admin: 01/14/19 01:19 Dose:  10 mg


Pantoprazole Sodium (Protonix -)  40 mg PO DAILY [facility]


   Last Admin: 01/14/19 10:58 Dose:  Not Given











- Objective


Vital Signs: 


 Vital Signs











Temperature  98.4 F   01/14/19 09:00


 


Pulse Rate  69   01/14/19 09:00


 


Respiratory Rate  18   01/14/19 09:00


 


Blood Pressure  125/73   01/14/19 09:00


 


O2 Sat by Pulse Oximetry (%)  96   01/14/19 09:00











Eyes: Yes: WNL, Conjunctiva Clear, EOM Intact


HENT: Yes: WNL, Atraumatic, Normocephalic


Neck: Yes: WNL, Supple, Trachea Midline


Cardiovascular: Yes: WNL, Regular Rate and Rhythm


Respiratory: Yes: WNL, Regular, CTA Bilaterally


Gastrointestinal: Yes: WNL, Normal Bowel Sounds


Genitourinary: Yes: WNL


Musculoskeletal: Yes: WNL


Extremities: Yes: WNL


Edema: Yes


Integumentary: Yes: WNL


Neurological: Yes: WNL, Alert, Oriented


...Motor Strength: WNL


Psychiatric: Yes: WNL


Labs: 


 CBC, BMP





 01/14/19 06:30 





 01/14/19 06:30 





 INR, PTT











INR  1.08  (0.83-1.09)   01/12/19  15:15    














Problem List





- Problems


(1) Femur fracture, left


Code(s): S72.92XA - UNSP FRACTURE OF LEFT FEMUR, INIT ENCNTR FOR CLOSED 

FRACTURE   


Qualifiers: 


   Encounter type: initial encounter   Femur location: unspecified portion of 

femur   Fracture type: closed   Fracture morphology: unspecified fracture 

morphology   Qualified Code(s): S72.92XA - Unspecified fracture of left femur, 

initial encounter for closed fracture   





(2) HLD (hyperlipidemia)


Code(s): E78.5 - HYPERLIPIDEMIA, UNSPECIFIED   





(3) HTN (hypertension)


Code(s): I10 - ESSENTIAL (PRIMARY) HYPERTENSION   





(4) Obesity


Code(s): E66.9 - OBESITY, UNSPECIFIED   





(5) Leg pain, left


Code(s): M79.605 - PAIN IN LEFT LEG   





Assessment/Plan





l femur fx


s/p l tkr


htn


hlp


reports negative cardiac w/u last year 


no evidence of angina or mi, reports good exercise tolerance prior to fall





Plan








patient is low risk for cardiovascular complications for ORIF.





cont DVT plx

## 2019-01-14 NOTE — PN
Progress Note (short form)





- Note


Progress Note: 





Ortho





Pt seen and examined





+ shortened and ER, calf soft, nt


nvi





a/p


OR today for left long IM gamma nail


NPO


d/w Dr. Cooper

## 2019-01-15 LAB
ALBUMIN SERPL-MCNC: 2.7 G/DL (ref 3.4–5)
ALP SERPL-CCNC: 88 U/L (ref 45–117)
ALT SERPL-CCNC: 14 U/L (ref 13–61)
ANION GAP SERPL CALC-SCNC: 5 MMOL/L (ref 8–16)
AST SERPL-CCNC: 23 U/L (ref 15–37)
BASOPHILS # BLD: 0.7 % (ref 0–2)
BILIRUB SERPL-MCNC: 0.8 MG/DL (ref 0.2–1)
BUN SERPL-MCNC: 20 MG/DL (ref 7–18)
CALCIUM SERPL-MCNC: 8 MG/DL (ref 8.5–10.1)
CHLORIDE SERPL-SCNC: 100 MMOL/L (ref 98–107)
CO2 SERPL-SCNC: 29 MMOL/L (ref 21–32)
CREAT SERPL-MCNC: 1.1 MG/DL (ref 0.55–1.3)
DEPRECATED RDW RBC AUTO: 12.8 % (ref 11.9–15.9)
EOSINOPHIL # BLD: 8.1 % (ref 0–4.5)
GLUCOSE SERPL-MCNC: 121 MG/DL (ref 74–106)
HCT VFR BLD CALC: 38.7 % (ref 35.4–49)
HGB BLD-MCNC: 12.5 GM/DL (ref 11.7–16.9)
LYMPHOCYTES # BLD: 15.7 % (ref 8–40)
MCH RBC QN AUTO: 30.8 PG (ref 25.7–33.7)
MCHC RBC AUTO-ENTMCNC: 32.4 G/DL (ref 32–35.9)
MCV RBC: 95.1 FL (ref 80–96)
MONOCYTES # BLD AUTO: 9.2 % (ref 3.8–10.2)
NEUTROPHILS # BLD: 66.3 % (ref 42.8–82.8)
PLATELET # BLD AUTO: 91 K/MM3 (ref 134–434)
PMV BLD: 10.1 FL (ref 7.5–11.1)
POTASSIUM SERPLBLD-SCNC: 3.9 MMOL/L (ref 3.5–5.1)
PROT SERPL-MCNC: 5.5 G/DL (ref 6.4–8.2)
RBC # BLD AUTO: 4.07 M/MM3 (ref 4–5.6)
SODIUM SERPL-SCNC: 134 MMOL/L (ref 136–145)
WBC # BLD AUTO: 6.9 K/MM3 (ref 4–10)

## 2019-01-15 RX ADMIN — ACETAMINOPHEN PRN MG: 325 TABLET ORAL at 10:02

## 2019-01-15 RX ADMIN — ATORVASTATIN CALCIUM SCH MG: 40 TABLET, FILM COATED ORAL at 22:26

## 2019-01-15 RX ADMIN — ACETAMINOPHEN PRN MG: 325 TABLET ORAL at 19:00

## 2019-01-15 RX ADMIN — ENOXAPARIN SODIUM SCH MG: 40 INJECTION SUBCUTANEOUS at 10:01

## 2019-01-15 RX ADMIN — PANTOPRAZOLE SODIUM SCH MG: 40 TABLET, DELAYED RELEASE ORAL at 10:07

## 2019-01-15 RX ADMIN — CEFAZOLIN SODIUM SCH MLS/HR: 2 SOLUTION INTRAVENOUS at 06:08

## 2019-01-15 RX ADMIN — ATENOLOL SCH MG: 50 TABLET ORAL at 10:02

## 2019-01-15 RX ADMIN — LORATADINE SCH MG: 10 TABLET ORAL at 18:46

## 2019-01-15 NOTE — PN
Progress Note (short form)





- Note


Progress Note: 





Patient did not  want to be examined by" another doctor."


Chart reviewed and falling platelets noted . 


HIT antibody ordered. .

## 2019-01-15 NOTE — PN
Progress Note (short form)





- Note


Progress Note: 





AVSS


COMFORTABLE


CALF SOFT AND NT


NVI


HCT OK





IMP:DOING WELL


PLAN: PT - WBAT, DC PLANNING

## 2019-01-15 NOTE — PN
Progress Note, Physician


Chief Complaint: 





Pt is upset over a financial issue from ?office visit. He says his heart is fine

, and does not want to see anyone for cardiac issues.


History of Present Illness: 





Mr. Rdz is a 79 yo male w/ pmh of HTN, HLD, left knee replacement (w/ 

chronic pain) who presents for evaluation of pain to left knee after falling in 

the bath tub today. Patient had tri phasic bone scan 1/8/18 for his chronic 

pain which revealed no acute findings. Currently scheduled to follow-up with 

orthopedics on Monday. Patient currently reporting exacerbation of his L knee 

pain. Took oxycodone at home already.





The patient denies chest pain, shortness of breath, headache and dizziness. 

Denies fever, chills, nausea, vomit, diarrhea and constipation. Denies dysuria, 

frequency, urgency and hematuria. 





Stress MIBI was negative for ischemia (03/2018 in office)











- Current Medication List


Current Medications: 


Active Medications





Acetaminophen (Tylenol -)  650 mg PO Q6H PRN


   PRN Reason: PAIN LEVEL 6-10


   Last Admin: 01/15/19 10:02 Dose:  650 mg


Atenolol (Tenormin -)  50 mg PO DAILY [facility]


   Last Admin: 01/15/19 10:02 Dose:  50 mg


Atorvastatin Calcium (Lipitor -)  40 mg PO HS [facility]


   Last Admin: 01/14/19 21:07 Dose:  40 mg


Enoxaparin Sodium (Lovenox -)  40 mg SQ DAILY [facility]


   Last Admin: 01/15/19 10:01 Dose:  40 mg


Guaifenesin (Robitussin -)  10 ml PO Q6H PRN


   PRN Reason: COUGH


   Last Admin: 01/14/19 21:07 Dose:  10 ml


Ondansetron HCl (Zofran Injection)  4 mg IVPUSH Q6H PRN


   PRN Reason: NAUSEA AND/OR VOMITING


Oxycodone HCl (Roxicodone -)  10 mg PO Q6H PRN


   PRN Reason: PAIN LEVEL 6-10


   Last Admin: 01/15/19 10:02 Dose:  10 mg


Pantoprazole Sodium (Protonix -)  40 mg PO DAILY [facility]


   Last Admin: 01/15/19 10:07 Dose:  40 mg











- Objective


Vital Signs: 


 Vital Signs











Temperature  98.3 F   01/15/19 06:00


 


Pulse Rate  65   01/15/19 06:00


 


Respiratory Rate  18   01/15/19 06:00


 


Blood Pressure  125/57 L  01/15/19 06:00


 


O2 Sat by Pulse Oximetry (%)  98   01/14/19 17:35











Labs: 


 CBC, BMP





 01/15/19 06:15 





 01/15/19 06:15 





 INR, PTT











INR  1.08  (0.83-1.09)   01/12/19  15:15    














Problem List





- Problems


(1) Femur fracture, left


Code(s): S72.92XA - UNSP FRACTURE OF LEFT FEMUR, INIT ENCNTR FOR CLOSED 

FRACTURE   


Qualifiers: 


   Encounter type: initial encounter   Femur location: unspecified portion of 

femur   Fracture type: closed   Fracture morphology: unspecified fracture 

morphology   Qualified Code(s): S72.92XA - Unspecified fracture of left femur, 

initial encounter for closed fracture   





(2) HLD (hyperlipidemia)


Code(s): E78.5 - HYPERLIPIDEMIA, UNSPECIFIED   





(3) HTN (hypertension)


Code(s): I10 - ESSENTIAL (PRIMARY) HYPERTENSION   





(4) Obesity


Code(s): E66.9 - OBESITY, UNSPECIFIED   





(5) Care refused by patient


Assessment/Plan: 


Pt repeatedly refused to see me today, saying he was upset over a matter that 

had taken place earlier this year. He says his heart is not a problem, and he 

does not want to see the cardiologist he had been seeing in the past.


Atress MIBI 03/2018: no ischemia.


Discussed with Dr. Mustafa; pt will be referred to another cardiologist.


Code(s): Z53.29 - PROC/TRTMT NOT CRD OUT BEC PT DECISION FOR OTH REASONS

## 2019-01-15 NOTE — OP
DATE OF OPERATION:  01/14/2019

 

PREOPERATIVE DIAGNOSIS:  Repeat left femoral shaft fracture.  

 

POSTOPERATIVE DIAGNOSIS:  Repeat left femoral shaft fracture.  

 

PROCEDURE:  Intramedullary rodding of left femoral shaft fracture and iliac crest

aspiration and open bone grafting of the fracture.

 

SURGICAL ATTENDING:  Zhao Cooper MD

 

FIRST ASSISTANT:  JEMIMA Yates

 

ANESTHESIA:  Spinal.  

 

CLOSURE:  A 11 x 400 mm, 125-degree gamma nail with appropriate interlocks, 0 Vicryl

fascia, 2-0 subcutaneous, and staples for skin.

 

ESTIMATED BLOOD LOSS:  Approximately 200 mL.  

 

COMPLICATIONS:  None.

 

CONDITION:  To recovery room in stable condition.

 

DESCRIPTION OF OPERATIVE PROCEDURE:  Patient was taken to the operating room on

January 14, 2019.  Spinal anesthesia was administered by the anesthesiologist along

with IV Kefzol prophylactically prior to the case.  Patient was fastened to the

fracture table with all prominences well padded.  Excellent reduction was confirmed

in the AP and lateral planes by using the image intensifier.  The left lateral femur

and iliac crest were prepped and draped in the usual sterile fashion using a shower

curtain.  

 

A 3-4-cm longitudinal incision with tip of the greater trochanter, and when incised

and hemostasis was achieved with Bovie cautery, sharp dissection was carried through

the fascia.  A guidewire was drilled from the tip of the greater trochanter into the

intramedullary canal.  Proper placement was confirmed by the AP and lateral planes by

using the image intensifier.  This was overreamed with the proximal reamer.  A

ball-tipped guidewire was placed down the intramedullary canal past the fracture into

the distal shaft down to the level of the knee (above the total knee replacement). 

Proper placement of the wire was confirmed in the AP and lateral planes by using the

image intensifier.  

 

Intramedullary canal was reamed up to a 12.5 reamer, which would facilitate an 11

markel.  The guidewire was measured to be 400 mm.  A 400 x 11 mm markel about 125-degree

angle was malletted down into place.  Using the outrigger and through a small stab

incision laterally, guidewire was drilled from lateral femur through the rods to the

femoral neck into the femoral head.  Proper placement was confirmed in the AP and

lateral planes by using the image intensifier.  This wire was depth-gauged and reamed

with a triple reamer, then screwed with the appropriate length screw.  A set screw

was placed from above in the static fashion locking the markel proximally.  The

outrigger was then removed.  

 

Using a free hand technique, two distal interlocks were drilled from lateral to

medially to two small stab incisions achieved excellent fixation.  At the beginning

of the case, an iliac crest aspirate needle was tapped into the iliac crest between

the two tables, 60 mL of intramedullary aspirate were then drawn out and were passed

off the table to be centrifuged.  After centrifuging and concentrating the cells, it

was mixed up with 30 mL of cancellous bone.  Using the image intensifier, small stab

incision was made at the level of the fracture.  The double-sleeve for the lag screw

was placed down to the level of the fracture and confirmed to be at the level of the

fracture.  The cancellous bone with the aspirate mixture was then placed down the

trocar and tamped into the level of the fracture.  

 

All incisions were irrigated with copious amounts of irrigation.  The fascia was

closed with 0 Vicryl, 2-0 for subcutaneous, and staples for skin.  Sterile pressure

dressing was applied.  Proper placement of all hardware and excellent reduction of

the fracture was performed in the AP and lateral plane using the image intensifier. 

The patient was taken off the fracture table and transferred to the recovery room in

stable condition.  No complications.  Estimated blood loss approximately 200 mL.  

 

 

ZHAO COOPER M.D.

 

[id number]

DD: 01/14/2019 16:57

DT: 01/15/2019 08:44

Job #:  [id number]

## 2019-01-15 NOTE — PN
Progress Note, Physician


Chief Complaint: 


postop day 1 LLE femoral fracture


had some cough since last night will check CXR


pt asked for valium for anxiety he said he was before on 5 mg po bid per psych d

/w pt possible risks and SE falls tolerance dependence he is aware; use prn only





- Current Medication List


Current Medications: 


Active Medications





Acetaminophen (Tylenol -)  650 mg PO Q6H PRN


   PRN Reason: PAIN LEVEL 6-10


   Last Admin: 01/14/19 23:26 Dose:  650 mg


Atenolol (Tenormin -)  [facility] mg PO DAILY [facility]


Atorvastatin Calcium (Lipitor -)  40 mg PO HS [facility]


   Last Admin: 01/14/19 21:07 Dose:  40 mg


Enoxaparin Sodium (Lovenox -)  40 mg SQ DAILY [facility]


Guaifenesin (Robitussin -)  10 ml PO Q6H PRN


   PRN Reason: COUGH


   Last Admin: 01/14/19 21:07 Dose:  10 ml


Ondansetron HCl (Zofran Injection)  4 mg IVPUSH Q6H PRN


   PRN Reason: NAUSEA AND/OR VOMITING


Oxycodone HCl (Roxicodone -)  10 mg PO Q6H PRN


   PRN Reason: PAIN LEVEL 6-10


   Last Admin: 01/15/19 02:03 Dose:  10 mg


Pantoprazole Sodium (Protonix -)  40 mg PO DAILY [facility]











- Objective


Vital Signs: 


 Vital Signs











Temperature  98.3 F   01/15/19 06:00


 


Pulse Rate  65   01/15/19 06:00


 


Respiratory Rate  18   01/15/19 06:00


 


Blood Pressure  125/57 L  01/15/19 06:00


 


O2 Sat by Pulse Oximetry (%)  98   01/14/19 17:35











Constitutional: Yes: No Distress, Calm


Eyes: Yes: Conjunctiva Clear


HENT: Yes: Atraumatic


Neck: Yes: Supple


Cardiovascular: Yes: Regular Rate and Rhythm


Respiratory: Yes: CTA Bilaterally


Gastrointestinal: Yes: Soft.  No: Tenderness


Genitourinary: No: CVA Tenderness - Left, CVA Tenderness - Right


Extremities: Yes: Other (LLE surgery).  No: Calf Tenderness, Cold, Cool


Edema: No


Integumentary: No: Rash, Venous Stasis Changes


Neurological: Yes: WNL, Alert, Oriented


...Motor Strength: WNL


Psychiatric: Yes: WNL, Alert, Oriented.  No: Agitated


Labs: 


 CBC, BMP





 01/15/19 06:15 





 01/15/19 06:15 





 INR, PTT











INR  1.08  (0.83-1.09)   01/12/19  15:15    














- ....Imaging


Other: Report Reviewed





Assessment/Plan





 The patient is a 78 year old male with a significant PMH of HTN, HLD, left 

knee replacement admitted with L mid-shaft femoral fracture


labs CXR EKG noted; 


s/p L femoral surgery for fracture


DVT pfx


valium prn for anxiety


cough: CXR; incentive spirometry


pain meds


d/w pt and staff

## 2019-01-16 LAB
BASOPHILS # BLD: 0.5 % (ref 0–2)
DEPRECATED RDW RBC AUTO: 12.4 % (ref 11.9–15.9)
EOSINOPHIL # BLD: 11 % (ref 0–4.5)
HCT VFR BLD CALC: 35.3 % (ref 35.4–49)
HGB BLD-MCNC: 12.1 GM/DL (ref 11.7–16.9)
LYMPHOCYTES # BLD: 15.3 % (ref 8–40)
MCH RBC QN AUTO: 32.4 PG (ref 25.7–33.7)
MCHC RBC AUTO-ENTMCNC: 34.2 G/DL (ref 32–35.9)
MCV RBC: 94.9 FL (ref 80–96)
MONOCYTES # BLD AUTO: 9.3 % (ref 3.8–10.2)
NEUTROPHILS # BLD: 63.9 % (ref 42.8–82.8)
PLATELET # BLD AUTO: 94 K/MM3 (ref 134–434)
PMV BLD: 11.1 FL (ref 7.5–11.1)
RBC # BLD AUTO: 3.73 M/MM3 (ref 4–5.6)
WBC # BLD AUTO: 6.7 K/MM3 (ref 4–10)

## 2019-01-16 RX ADMIN — ATENOLOL SCH MG: 50 TABLET ORAL at 10:18

## 2019-01-16 RX ADMIN — PANTOPRAZOLE SODIUM SCH MG: 40 TABLET, DELAYED RELEASE ORAL at 10:18

## 2019-01-16 RX ADMIN — LORATADINE SCH MG: 10 TABLET ORAL at 10:18

## 2019-01-16 RX ADMIN — ATORVASTATIN CALCIUM SCH MG: 40 TABLET, FILM COATED ORAL at 21:35

## 2019-01-16 RX ADMIN — DOCUSATE SODIUM SCH MG: 100 CAPSULE, LIQUID FILLED ORAL at 10:18

## 2019-01-16 RX ADMIN — ENOXAPARIN SODIUM SCH MG: 40 INJECTION SUBCUTANEOUS at 10:18

## 2019-01-16 NOTE — DS
Physical Examination


Vital Signs: 


 Vital Signs











Temperature  98.4 F   01/16/19 05:00


 


Pulse Rate  73   01/16/19 05:00


 


Respiratory Rate  20   01/16/19 05:00


 


Blood Pressure  136/65   01/16/19 05:00


 


O2 Sat by Pulse Oximetry (%)  98   01/14/19 17:35











Findings/Remarks: 





in bed awake alert NAD afebrile VSS much less cough no sputum no CP/SOB; no 

hemoptysis; doing incentive spirometry.


PLT 94 stable; refused heme eval; antiPLT atb pending;


cleared by ortho for DC to SNF d/w pt and wife at bedside and d/w CM


falls DVT decubs aspiration and constipation pfx d/w pt and wife; also d/w them 

CXR few atelectasis - advised to get chest CT no IVC outpt for f/u and h/o 

smoking (although he stopped 20 years ago)


f/u ortho in 1-2 qweeks after DC from 


f/u cardiology dr Jarvis in 1-2 months after DC from [facility]


GI and health screening outpt per PCP


d/w pt and wife all the above


Constitutional: Yes: No Distress, Calm


Eyes: Yes: Conjunctiva Clear


HENT: Yes: Atraumatic


Neck: Yes: Supple


Cardiovascular: Yes: Regular Rate and Rhythm


Respiratory: Yes: CTA Bilaterally


Gastrointestinal: Yes: Soft.  No: Tenderness


Renal/: No: CVA Tenderness - Left, CVA Tenderness - Right


Musculoskeletal: No: Joint Stiffness, Joint Swelling


Extremities: Yes: Other (LLE surgery).  No: Cold, Cool, Cyanosis


Edema: No


Integumentary: No: Rash, Venous Stasis Changes


Neurological: Yes: WNL, Alert, Oriented


...Motor Strength: WNL


Psychiatric: Yes: WNL, Alert, Oriented.  No: Agitated, Suicidal Ideation


Labs: 


 CBC, BMP





 01/15/19 06:15 





 01/15/19 06:15 











Discharge Summary


Reason For Visit: FRATURE OF LEFT FEMUR


Current Active Problems





Care refused by patient (Acute)


Femur fracture, left (Acute)


HLD (hyperlipidemia) (Acute)


HTN (hypertension) (Acute)


Obesity (Acute)








Procedures: Principal: s/p LLE femoral fracture after a fall


Other Procedures: ortho surgery dr Cooper; needs PT rehab;.  seen by cardiology 

for clearance and heme for bordwrline low PLT; f/u outpt;.  had transient cough 

postop better now CXR atelectasis; to have chest CT outpt (h/o smoking many 

years ago);.  anxiety on valium prn; falls PFX


Hospital Course: 





improved with above; DC to SNF


Condition: Improved





- Instructions


Referrals: 


Hong Mustafa MD [Primary Care Provider] - 


Disposition: SKILLED NURSING FACILITY





- Home Medications


Comprehensive Discharge Medication List: 


Ambulatory Orders





Atenolol [Tenormin] 50 mg PO DAILY 12/17/18 


Atorvastatin Ca [Lipitor] 40 mg PO HS 12/17/18 


Omeprazole 20 mg PO DAILY 01/12/19 


Acetaminophen [Tylenol .Regular Strength -] 650 mg PO Q6H PRN  tablet 01/16/19 


Diazepam [Valium] 5 mg PO BID PRN  tablet MDD 10 mg 01/16/19 


Docusate Sodium [Colace -] 200 mg PO DAILY  capsule 01/16/19 


Enoxaparin [Lovenox -] 40 mg SQ DAILY  disp.syrin 01/16/19 


Guaifenesin [Robitussin -] 10 ml PO Q6H PRN  cup 01/16/19 


Loratadine [Claritin -] 10 mg PO DAILY PRN  tablet 01/16/19 


Polyvinyl Alcohol [Artificial Tears] 1 drop OU Q6H PRN  drops 01/16/19 


oxyCODONE HCL [Roxicodone -] 5 mg PO Q6H PRN  tablet MDD 20 mg 01/16/19

## 2019-01-16 NOTE — PN
Progress Note (short form)





- Note


Progress Note: 





Pt seen and examined. He is on POD #2 s/p left femur fracturs and IM nail. He 

appears completely comfortable but c/o pain left thigh.


AVSS


H/H stable at 12.1/35


LLE is NVI


Good ROM at the left foot, ankle, toes


Left thigh is not tense, not tender


Dressing CDI


Overall the pt is doing very well on POD #2.


He is stable and ready for discharge to a SNF today


F/U with Dr Cooper in 7-10 days

## 2019-01-17 RX ADMIN — ESCITALOPRAM SCH MG: 20 TABLET, FILM COATED ORAL at 21:36

## 2019-01-17 RX ADMIN — ATENOLOL SCH MG: 50 TABLET ORAL at 10:15

## 2019-01-17 RX ADMIN — ATORVASTATIN CALCIUM SCH MG: 40 TABLET, FILM COATED ORAL at 21:36

## 2019-01-17 RX ADMIN — ENOXAPARIN SODIUM SCH MG: 40 INJECTION SUBCUTANEOUS at 10:14

## 2019-01-17 RX ADMIN — DOCUSATE SODIUM SCH MG: 100 CAPSULE, LIQUID FILLED ORAL at 10:15

## 2019-01-17 RX ADMIN — LORATADINE SCH MG: 10 TABLET ORAL at 10:15

## 2019-01-17 RX ADMIN — PANTOPRAZOLE SODIUM SCH MG: 40 TABLET, DELAYED RELEASE ORAL at 10:35

## 2019-01-17 NOTE — PN
Progress Note, Physician


Chief Complaint: 





with some dry cough on/off no CP or SOB; h/o ex smoker; will  check chest CT now


awaiting SNF placement


L thigh pain on/off


pt mentioned he is taking valium and lexapro bid per psych dr Villanueva, ordered 

per pt's home doses 





- Current Medication List


Current Medications: 


Active Medications





Acetaminophen (Tylenol -)  650 mg PO Q6H PRN


   PRN Reason: PAIN LEVEL 6-10


   Last Admin: 01/15/19 19:00 Dose:  650 mg


Artificial Tears (Artificial Tears)  1 drop OU Q6H PRN


   PRN Reason: DRY EYES


   Last Admin: 01/15/19 22:29 Dose:  1 drop


Atenolol (Tenormin -)  50 mg PO DAILY [facility]


   Last Admin: 01/16/19 10:18 Dose:  50 mg


Atorvastatin Calcium (Lipitor -)  40 mg PO HS [facility]


   Last Admin: 01/16/19 21:35 Dose:  40 mg


Diazepam (Valium -)  5 mg PO BID PRN


   PRN Reason: ANXIETY


   Last Admin: 01/16/19 21:35 Dose:  5 mg


Docusate Sodium (Colace -)  200 mg PO DAILY [facility]


   Last Admin: 01/16/19 10:18 Dose:  200 mg


Enoxaparin Sodium (Lovenox -)  40 mg SQ DAILY [facility]


   Last Admin: 01/16/19 10:18 Dose:  40 mg


Guaifenesin (Robitussin -)  10 ml PO Q6H PRN


   PRN Reason: COUGH


   Last Admin: 01/14/19 21:07 Dose:  10 ml


Loratadine (Claritin -)  10 mg PO DAILY [facility]


   Last Admin: 01/16/19 10:18 Dose:  10 mg


Ondansetron HCl (Zofran Injection)  4 mg IVPUSH Q6H PRN


   PRN Reason: NAUSEA AND/OR VOMITING


Oxycodone HCl (Roxicodone -)  10 mg PO Q6H PRN


   PRN Reason: PAIN LEVEL 6-10


   Last Admin: 01/16/19 21:35 Dose:  10 mg


Pantoprazole Sodium (Protonix -)  40 mg PO DAILY [facility]


   Last Admin: 01/16/19 10:18 Dose:  40 mg











- Objective


Vital Signs: 


 Vital Signs











Temperature  98.4 F   01/17/19 05:00


 


Pulse Rate  66   01/17/19 05:00


 


Respiratory Rate  20   01/17/19 05:00


 


Blood Pressure  131/53 L  01/17/19 05:00


 


O2 Sat by Pulse Oximetry (%)  98   01/14/19 17:35











Constitutional: Yes: No Distress, Calm


Eyes: Yes: Conjunctiva Clear


HENT: Yes: Atraumatic


Neck: Yes: Supple


Cardiovascular: Yes: Regular Rate and Rhythm


Respiratory: Yes: CTA Bilaterally


Gastrointestinal: Yes: Soft.  No: Tenderness


Genitourinary: No: CVA Tenderness - Left, CVA Tenderness - Right


Musculoskeletal: No: Joint Stiffness, Joint Swelling


Extremities: Yes: Other (L thigh wound postop, clean no rash no edema).  No: 

Cold, Cool


Edema: No


Integumentary: No: Rash, Venous Stasis Changes


Neurological: Yes: WNL, Alert, Oriented


...Motor Strength: WNL


Psychiatric: Yes: WNL, Alert, Oriented.  No: Agitated


Labs: 


 CBC, BMP





 01/16/19 06:30 





 01/15/19 06:15 





 INR, PTT











INR  1.08  (0.83-1.09)   01/12/19  15:15    














- ....Imaging


Other: Report Reviewed





Assessment/Plan





 The patient is a 78 year old male with a significant PMH of HTN, HLD, left 

knee replacement admitted with L mid-shaft femoral fracture; hyponatremia;


labs CXR EKG noted; 


s/p L femoral surgery for fracture


DVT pfx


valium prn for anxiety


cough: chest CT; incentive spirometry


pain meds


d/w pt and staff; SNF placement pending insurance approval

## 2019-01-17 NOTE — PN
Progress Note (short form)





- Note


Progress Note: 





Pt seen and examined. He is doing well, less pain.


AVSS


H/H stable


LLE is NVI, good ROM at the L knee, ankle, foot, toes. Thigh not tense,


Overall doing very well, stable.


Rec: DC to SNF today. F/u with Dr Cooper in 7-14 days

## 2019-01-18 VITALS — HEART RATE: 71 BPM | DIASTOLIC BLOOD PRESSURE: 75 MMHG | TEMPERATURE: 98.1 F | SYSTOLIC BLOOD PRESSURE: 130 MMHG

## 2019-01-18 RX ADMIN — ATENOLOL SCH: 50 TABLET ORAL at 10:42

## 2019-01-18 RX ADMIN — ESCITALOPRAM SCH MG: 20 TABLET, FILM COATED ORAL at 08:57

## 2019-01-18 RX ADMIN — DOCUSATE SODIUM SCH: 100 CAPSULE, LIQUID FILLED ORAL at 10:41

## 2019-01-18 RX ADMIN — DOCUSATE SODIUM SCH MG: 100 CAPSULE, LIQUID FILLED ORAL at 08:58

## 2019-01-18 RX ADMIN — LORATADINE SCH: 10 TABLET ORAL at 10:41

## 2019-01-18 RX ADMIN — LORATADINE SCH MG: 10 TABLET ORAL at 08:58

## 2019-01-18 RX ADMIN — ATENOLOL SCH MG: 50 TABLET ORAL at 08:58

## 2019-01-18 RX ADMIN — ESCITALOPRAM SCH: 20 TABLET, FILM COATED ORAL at 10:42

## 2019-01-18 RX ADMIN — ACETAMINOPHEN PRN MG: 325 TABLET ORAL at 09:00

## 2019-01-18 RX ADMIN — PANTOPRAZOLE SODIUM SCH MG: 40 TABLET, DELAYED RELEASE ORAL at 08:58

## 2019-01-18 RX ADMIN — ENOXAPARIN SODIUM SCH MG: 40 INJECTION SUBCUTANEOUS at 09:01

## 2019-01-18 RX ADMIN — PANTOPRAZOLE SODIUM SCH: 40 TABLET, DELAYED RELEASE ORAL at 10:42

## 2019-01-18 NOTE — PN
Progress Note, Physician


Chief Complaint: 





in bed NAD afebrile awaiting DC to [facility] occasional LLE pain better with pain meds

; on DVT pfx takes it


no more cough no CP/SOB; incentive spirometry to be done few times a day d/w pt 


chest CT d/w pt CANDELARIA atelectasis, h/o smoking, to f/u chest CT in 3 months outpt 

to ensure resolution of changes; script given


?R shoulder changes? needs R shoulder xrays ordered but pt does not want to do 

them at this point, has no pain, to do it outpt script given


d/w pt f/u needed after DC home;


to see ortho in SNF in 1 week d/w pt and staff





- Current Medication List


Current Medications: 


Active Medications





Acetaminophen (Tylenol -)  650 mg PO Q6H PRN


   PRN Reason: PAIN LEVEL 6-10


   Last Admin: 01/15/19 19:00 Dose:  650 mg


Artificial Tears (Artificial Tears)  1 drop OU Q6H PRN


   PRN Reason: DRY EYES


   Last Admin: 01/15/19 22:29 Dose:  1 drop


Atenolol (Tenormin -)  50 mg PO DAILY [facility]


   Last Admin: 01/17/19 10:15 Dose:  50 mg


Atorvastatin Calcium (Lipitor -)  40 mg PO HS [facility]


   Last Admin: 01/17/19 21:36 Dose:  40 mg


Diazepam (Valium -)  5 mg PO BID PRN


   PRN Reason: ANXIETY


   Last Admin: 01/17/19 21:36 Dose:  5 mg


Docusate Sodium (Colace -)  200 mg PO DAILY [facility]


   Last Admin: 01/17/19 10:15 Dose:  200 mg


Enoxaparin Sodium (Lovenox -)  40 mg SQ DAILY [facility]


   Last Admin: 01/17/19 10:14 Dose:  40 mg


Escitalopram Oxalate (Lexapro -)  20 mg PO BID [facility]


   Last Admin: 01/17/19 21:36 Dose:  20 mg


Guaifenesin (Robitussin -)  10 ml PO Q6H PRN


   PRN Reason: COUGH


   Last Admin: 01/14/19 21:07 Dose:  10 ml


Loratadine (Claritin -)  10 mg PO DAILY [facility]


   Last Admin: 01/17/19 10:15 Dose:  10 mg


Oxycodone HCl (Roxicodone -)  10 mg PO Q6H PRN


   PRN Reason: PAIN LEVEL 6-10


   Last Admin: 01/17/19 17:00 Dose:  10 mg


Pantoprazole Sodium (Protonix -)  40 mg PO DAILY [facility]


   Last Admin: 01/17/19 10:35 Dose:  40 mg











- Objective


Vital Signs: 


 Vital Signs











Temperature  98.2 F   01/17/19 21:00


 


Pulse Rate  65   01/17/19 21:00


 


Respiratory Rate  18   01/17/19 21:00


 


Blood Pressure  125/65   01/17/19 21:00


 


O2 Sat by Pulse Oximetry (%)  98   01/14/19 17:35











Constitutional: Yes: No Distress, Calm


Eyes: Yes: Conjunctiva Clear


HENT: Yes: Atraumatic


Neck: Yes: Supple


Cardiovascular: Yes: Regular Rate and Rhythm


Respiratory: Yes: CTA Bilaterally.  No: Cough


Gastrointestinal: Yes: Soft.  No: Tenderness


Genitourinary: No: CVA Tenderness - Left, CVA Tenderness - Right


Musculoskeletal: No: Joint Stiffness, Joint Swelling


Extremities: Yes: Other (L thigh surgery wound dressed no bleed no edema seen 

by ortho).  No: Cold, Cool, Cyanosis


Edema: No


Integumentary: No: Rash, Venous Stasis Changes


Neurological: Yes: WNL, Alert, Oriented


...Motor Strength: WNL


Psychiatric: Yes: WNL, Alert, Oriented.  No: Agitated, Suicidal Ideation


Labs: 


 CBC, BMP





 01/16/19 06:30 





 01/15/19 06:15 





 INR, PTT











INR  1.08  (0.83-1.09)   01/12/19  15:15    














- ....Imaging


Other: Report Reviewed





Assessment/Plan





 The patient is a 78 year old male with a significant PMH of HTN, HLD, left 

knee replacement admitted with L mid-shaft femoral fracture; hyponatremia most 

likely sec to [place] sec to pain; 


f/u lab q1 week in SNF


s/p L femoral surgery for fracture for PT / SNF


DVT pfx


valium prn for anxiety


pain meds


d/w pt and staff; see above.

## 2019-01-18 NOTE — PN
Progress Note (short form)





- Note


Progress Note: 





Ortho





Pt seen and examined s/p left long IM gamma nail





 Selected Entries











  01/18/19





  06:28


 


Temperature 98.7 F


 


Pulse Rate 67


 


Respiratory 18





Rate 


 


Blood Pressure 129/62








 Laboratory Tests











  01/16/19





  06:30


 


WBC  6.7


 


Hgb  12.1


 


Hct  35.3 L


 


Plt Count  94 L








dressing c/d/i, calf soft,nt


nvi





a/p


PT


wbat


dvt ppx


pain control


d/c planning

## 2019-02-01 ENCOUNTER — HOSPITAL ENCOUNTER (INPATIENT)
Dept: HOSPITAL 74 - JER | Age: 79
LOS: 3 days | Discharge: HOME | End: 2019-02-04
Payer: COMMERCIAL